# Patient Record
Sex: MALE | Race: BLACK OR AFRICAN AMERICAN | Employment: FULL TIME | URBAN - METROPOLITAN AREA
[De-identification: names, ages, dates, MRNs, and addresses within clinical notes are randomized per-mention and may not be internally consistent; named-entity substitution may affect disease eponyms.]

---

## 2018-08-29 ENCOUNTER — HOSPITAL ENCOUNTER (OUTPATIENT)
Dept: GENERAL RADIOLOGY | Age: 53
Discharge: HOME | End: 2018-08-29
Payer: COMMERCIAL

## 2018-08-29 DIAGNOSIS — R05.9 COUGH: ICD-10-CM

## 2018-08-29 PROCEDURE — 71046 X-RAY EXAM CHEST 2 VIEWS: CPT

## 2019-12-12 ENCOUNTER — HOSPITAL ENCOUNTER (OUTPATIENT)
Dept: GENERAL RADIOLOGY | Age: 54
Discharge: HOME | End: 2019-12-12
Attending: INTERNAL MEDICINE
Payer: COMMERCIAL

## 2019-12-12 DIAGNOSIS — K21.00 ESOPHAGITIS, REFLUX: ICD-10-CM

## 2019-12-12 DIAGNOSIS — R11.10 VOMITING: ICD-10-CM

## 2019-12-12 PROCEDURE — 74249 XR UPPER GI AIR CONT/SMALL BOWEL: CPT

## 2020-03-15 ENCOUNTER — HOSPITAL ENCOUNTER (EMERGENCY)
Age: 55
Discharge: HOME | End: 2020-03-15
Attending: EMERGENCY MEDICINE
Payer: COMMERCIAL

## 2020-03-15 ENCOUNTER — APPOINTMENT (OUTPATIENT)
Dept: CT IMAGING | Age: 55
End: 2020-03-15
Attending: EMERGENCY MEDICINE
Payer: COMMERCIAL

## 2020-03-15 VITALS
HEART RATE: 93 BPM | OXYGEN SATURATION: 99 % | HEIGHT: 71 IN | TEMPERATURE: 98.3 F | BODY MASS INDEX: 30.1 KG/M2 | RESPIRATION RATE: 14 BRPM | DIASTOLIC BLOOD PRESSURE: 80 MMHG | SYSTOLIC BLOOD PRESSURE: 148 MMHG | WEIGHT: 215 LBS

## 2020-03-15 DIAGNOSIS — R36.1 HEMATOSPERMIA: Primary | ICD-10-CM

## 2020-03-15 DIAGNOSIS — Z20.2 STD EXPOSURE: ICD-10-CM

## 2020-03-15 LAB
APPEARANCE UR: CLEAR
BACTERIA URNS QL MICRO: NEGATIVE /HPF
BILIRUB UR QL: NEGATIVE
COLOR UR: NORMAL
EPITH CASTS URNS QL MICRO: NORMAL /LPF
GLUCOSE UR STRIP.AUTO-MCNC: NEGATIVE MG/DL
HGB UR QL STRIP: NEGATIVE
KETONES UR QL STRIP.AUTO: NEGATIVE MG/DL
LEUKOCYTE ESTERASE UR QL STRIP.AUTO: NEGATIVE
NITRITE UR QL STRIP.AUTO: NEGATIVE
PH UR STRIP: 5.5 [PH] (ref 5–8)
PROT UR STRIP-MCNC: NEGATIVE MG/DL
RBC #/AREA URNS HPF: NORMAL /HPF (ref 0–5)
SP GR UR REFRACTOMETRY: 1.02 (ref 1–1.03)
UA: UC IF INDICATED,UAUC: NORMAL
UROBILINOGEN UR QL STRIP.AUTO: 1 EU/DL (ref 0.2–1)
WBC URNS QL MICRO: NORMAL /HPF (ref 0–4)

## 2020-03-15 PROCEDURE — 87491 CHLMYD TRACH DNA AMP PROBE: CPT

## 2020-03-15 PROCEDURE — 74011000250 HC RX REV CODE- 250: Performed by: EMERGENCY MEDICINE

## 2020-03-15 PROCEDURE — 96372 THER/PROPH/DIAG INJ SC/IM: CPT

## 2020-03-15 PROCEDURE — 81001 URINALYSIS AUTO W/SCOPE: CPT

## 2020-03-15 PROCEDURE — 99283 EMERGENCY DEPT VISIT LOW MDM: CPT

## 2020-03-15 PROCEDURE — 74011250636 HC RX REV CODE- 250/636: Performed by: EMERGENCY MEDICINE

## 2020-03-15 PROCEDURE — 74176 CT ABD & PELVIS W/O CONTRAST: CPT

## 2020-03-15 PROCEDURE — 74011250637 HC RX REV CODE- 250/637: Performed by: EMERGENCY MEDICINE

## 2020-03-15 RX ORDER — AZITHROMYCIN 500 MG/1
1000 TABLET, FILM COATED ORAL
Status: COMPLETED | OUTPATIENT
Start: 2020-03-15 | End: 2020-03-15

## 2020-03-15 RX ADMIN — AZITHROMYCIN 1000 MG: 500 TABLET, FILM COATED ORAL at 17:34

## 2020-03-15 RX ADMIN — LIDOCAINE HYDROCHLORIDE 250 MG: 10 INJECTION, SOLUTION EPIDURAL; INFILTRATION; INTRACAUDAL; PERINEURAL at 17:34

## 2020-03-15 NOTE — DISCHARGE INSTRUCTIONS
Patient Education        Learning How to Use a Male Condom  What is a male condom? Condoms can be used to prevent pregnancy. They can also help protect against sexually transmitted infections (STIs). You must use a new condom every time you have sex. Condoms prevent pregnancy by keeping sperm and eggs apart. The condom holds the sperm so the sperm can't get into the vagina. A male condom is a tube of soft rubber or plastic with a closed end. It fits over the penis. There are many kinds of male condoms. Some condoms are lubricated. Some are ribbed. Most have a \"reservoir tip\" for holding the semen. You can also buy condoms of different sizes. How do you use a condom? Condoms work best if you follow these steps. · Use a new condom each time you have sex. · Check the condom's expiration date. Do not use it past that date. · When opening the condom wrapper, be sure not to poke a hole in the condom with your fingernails, teeth, or other sharp objects. · Put the condom on as soon as the penis is hard (erect) and before any sexual contact with your partner. ? First, hold the tip of the condom and squeeze out the air. This leaves room for the semen after you ejaculate. ? If you are not circumcised, pull down the loose skin from the head of the penis (foreskin) before you put on the condom. ? Hold on to the tip of the condom as you unroll the condom. Unroll it all the way down to the base of the penis. · After you ejaculate, hold on to the condom at the base of the penis, and withdraw from your partner while your penis is still erect. This will keep semen from spilling out of the condom. · Wash your hands after you handle a used condom. How do you buy and store condoms? · Male condoms may be available for free at family planning clinics. You can buy them without a prescription at drugstores, online, and in some grocery stores.   · Keep condoms wrapped in their original packages until you are ready to use them. Store them in a cool, dry place out of direct sunlight. · Don't keep rubber (latex) condoms in a glove compartment or other hot places for a long time. Heat weakens latex and increases the chance that the condom will break. · Don't use condoms in damaged packages. And don't use condoms that are brittle, sticky, or discolored, even if they are not past their expiration date. What else do you need to know? · To protect yourself and your partner from STIs, use a condom during vaginal, oral, or anal sex. · If the condom breaks or you think sperm may have leaked out into the vagina, the woman can use emergency contraception to help prevent pregnancy. The most effective emergency contraception is prescribed by a doctor. This includes the copper IUD (inserted by a doctor) or a prescription pill. You can also get emergency contraceptive pills without a prescription at [facility]. · Use a male condom with another form of birth control. It's the best way to prevent pregnancy. ? You can use the condom with hormonal contraception, an intrauterine device (IUD), a diaphragm, a sponge, a shield, or a cervical cap. ? Don't use a male condom with a female condom. ? Use spermicide as its instructions say. Don't put spermicide inside a condom. · If you or your partner gets a rash or feels itchy after using a latex condom, talk to your doctor. You may have a latex allergy. Where can you learn more? Go to http://eddie-cassi.info/  Enter R522 in the search box to learn more about \"Learning How to Use a Male Condom. \"  Current as of: May 29, 2019Content Version: 12.4  © 2294-8550 Healthwise, Incorporated. Care instructions adapted under license by [employer] (which disclaims liability or warranty for this information).  If you have questions about a medical condition or this instruction, always ask your healthcare professional. [employer] disclaims any warranty or liability for your use of this information. Patient Education        Exposure to Sexually Transmitted Infections: Care Instructions  Your Care Instructions  Sexually transmitted infections (STIs) are those diseases spread by sexual contact. There are at least 20 different STIs, including chlamydia, gonorrhea, syphilis, and human immunodeficiency virus (HIV), which causes AIDS. Bacteria-caused STIs can be treated and cured. STIs caused by viruses, such as HIV, can be treated but not cured. Some STIs can reduce a woman's chances of getting pregnant in the future. STIs are spread during sexual contact, such as vaginal intercourse and oral or anal sex. Follow-up care is a key part of your treatment and safety. Be sure to make and go to all appointments, and call your doctor if you are having problems. It's also a good idea to know your test results and keep a list of the medicines you take. How can you care for yourself at home? · Your doctor may have given you a shot of antibiotics. If your doctor prescribed antibiotic pills, take them as directed. Do not stop taking them just because you feel better. You need to take the full course of antibiotics. · Do not have sexual contact while you have symptoms of an STI or are being treated for an STI. · Tell your sex partner (or partners) that he or she will need treatment. · If you are a woman, do not douche. Douching changes the normal balance of bacteria in the vagina and may spread an infection up into your reproductive organs. To prevent exposure to STIs in the future  · Use latex condoms every time you have sex. Use them from the beginning to the end of sexual contact. · Talk to your partner before you have sex. Find out if he or she has or is at risk for any STI. Keep in mind that a person may be able to spread an STI even if he or she does not have symptoms. · Do not have sex if you are being treated for an STI.   · Do not have sex with anyone who has symptoms of an STI, such as sores on the genitals or mouth. · Having one sex partner (who does not have STIs and does not have sex with anyone else) is a good way to avoid STIs. When should you call for help? Call your doctor now or seek immediate medical care if:    · You have new pain in your belly or pelvis.     · You have symptoms of a urinary tract infection. These may include:  ? Pain or burning when you urinate. ? A frequent need to urinate without being able to pass much urine. ? Pain in the flank, which is just below the rib cage and above the waist on either side of the back. ? Blood in your urine. ? A fever.     · You have new or worsening pain or swelling in the scrotum.    Watch closely for changes in your health, and be sure to contact your doctor if:    · You have unusual vaginal bleeding.     · You have a discharge from the vagina or penis.     · You have any new symptoms, such as sores, bumps, rashes, blisters, or warts.     · You have itching, tingling, pain, or burning in the genital or anal area.     · You think you may have an STI. Where can you learn more? Go to http://eddie-cassi.info/  Enter M049 in the search box to learn more about \"Exposure to Sexually Transmitted Infections: Care Instructions. \"  Current as of: July 7, 2019Content Version: 12.4  © 6970-5480 Healthwise, Incorporated. Care instructions adapted under license by [employer] (which disclaims liability or warranty for this information). If you have questions about a medical condition or this instruction, always ask your healthcare professional. [address] any warranty or liability for your use of this information.

## 2020-03-15 NOTE — ED PROVIDER NOTES
EMERGENCY DEPARTMENT HISTORY AND PHYSICAL EXAM      Date: 3/15/2020  Patient Name: Kilo Morris    History of Presenting Illness     Chief Complaint   Patient presents with    Blood in Urine     pt reported he seen blood in urine twice in this wk,denies seen any blood today. History Provided By: Patient    HPI: Kilo Morris, 48 y.o. male with no significant PMHx, presents ambulatory to the ED with cc of 2 episodes of hematuria this week as well as hematospermia last night at completion of sexual intercourse. Pt states that \"I saw some blood in my semen last night after sex. \" He denies any recent trauma/injury to the genitals. Pt notes mild abdominal discomfort but denies any abdominal pain or pain with urination. He denies any alleviating/exacerbating factors for his current sx's. He specifically denies any fever, chills, SOB, CP, HA, N/V/D, abdominal pain, or rash. There are no other complaints, changes, or physical findings at this time. PCP: Other, MD Su        Past History     Past Medical History:  No past medical history on file. Past Surgical History:  No past surgical history on file. Family History:  No family history on file. Social History:  Social History     Tobacco Use    Smoking status: Not on file   Substance Use Topics    Alcohol use: Not on file    Drug use: Not on file       Allergies:  No Known Allergies    Review of Systems   Review of Systems   Constitutional: Negative for chills and fever. HENT: Negative for sore throat and trouble swallowing. Eyes: Negative for photophobia and redness. Respiratory: Negative for cough and shortness of breath. Cardiovascular: Negative for chest pain and leg swelling. Gastrointestinal: Negative for abdominal pain, constipation, diarrhea, nausea and vomiting. Endocrine: Negative for polydipsia and polyuria. Genitourinary: Positive for hematuria. Negative for dysuria and scrotal swelling.    Musculoskeletal: Negative for back pain and joint swelling. Skin: Negative for rash. Neurological: Negative for dizziness, syncope, weakness and headaches. Psychiatric/Behavioral: Negative for suicidal ideas. All other systems reviewed and are negative. Physical Exam   Physical Exam  Vitals signs and nursing note reviewed. Constitutional:       Appearance: Normal appearance. He is well-developed. HENT:      Head: Normocephalic and atraumatic. Neck:      Musculoskeletal: Full passive range of motion without pain, normal range of motion and neck supple. Cardiovascular:      Rate and Rhythm: Normal rate and regular rhythm. Pulses: Normal pulses. Heart sounds: Normal heart sounds. No murmur. Pulmonary:      Effort: Pulmonary effort is normal. No respiratory distress. Breath sounds: Normal breath sounds. Chest:      Chest wall: No tenderness. Abdominal:      General: Bowel sounds are normal.      Palpations: Abdomen is soft. Tenderness: There is no abdominal tenderness. There is no guarding or rebound. Skin:     General: Skin is warm and dry. Findings: No erythema or rash. Neurological:      Mental Status: He is alert and oriented to person, place, and time. Psychiatric:         Speech: Speech normal.         Behavior: Behavior normal.         Thought Content:  Thought content normal.         Judgment: Judgment normal.       Diagnostic Study Results     Labs -     Recent Results (from the past 12 hour(s))   URINALYSIS W/ REFLEX CULTURE    Collection Time: 03/15/20  4:28 PM   Result Value Ref Range    Color YELLOW/STRAW      Appearance CLEAR CLEAR      Specific gravity 1.020 1.003 - 1.030      pH (UA) 5.5 5.0 - 8.0      Protein NEGATIVE  NEG mg/dL    Glucose NEGATIVE  NEG mg/dL    Ketone NEGATIVE  NEG mg/dL    Bilirubin NEGATIVE  NEG      Blood NEGATIVE  NEG      Urobilinogen 1.0 0.2 - 1.0 EU/dL    Nitrites NEGATIVE  NEG      Leukocyte Esterase NEGATIVE  NEG      WBC 0-4 0 - 4 /hpf    RBC 0-5 0 - 5 /hpf    Epithelial cells FEW FEW /lpf    Bacteria NEGATIVE  NEG /hpf    UA:UC IF INDICATED CULTURE NOT INDICATED BY UA RESULT CNI         Radiologic Studies -   CT ABD PELV WO CONT   Final Result   IMPRESSION:      1. No evidence of acute process in the abdomen or pelvis. No urolithiasis. CT Results  (Last 48 hours)               03/15/20 1703  CT ABD PELV WO CONT Final result    Impression:  IMPRESSION:       1. No evidence of acute process in the abdomen or pelvis. No urolithiasis. Narrative:  EXAM:  CT ABD PELV WO CONT       INDICATION: hematuria       COMPARISON: Upper GI series 12/12/2019. CONTRAST:  None. TECHNIQUE:    Thin axial images were obtained through the abdomen and pelvis. Coronal and   sagittal reconstructions were generated. Oral contrast was not administered. CT   dose reduction was achieved through use of a standardized protocol tailored for   this examination and automatic exposure control for dose modulation. FINDINGS:    Lower Thorax:   Lung Bases: Clear. Heart: The heart is normal in size. No pericardial effusion. Abdomen/Pelvis:   Evaluation of the solid organs is markedly limited without the use of IV   contrast.       Liver:  No focal liver lesions. Biliary system: Gallbladder is unremarkable. Spleen: Normal.       Pancreas: Normal.       Kidneys/Ureters/Bladder: Simple cyst in the upper pole of the left kidney   measuring 14 mm. No renal or ureteral calculi. No hydronephrosis or hydroureter. The bladder is normal.       Adrenals: Normal.       Stomach/bowel: No dilation or abnormal wall thickening is present. No free   intraperitoneal air noted. Normal appendix. Reproductive Organs: Prostate is normal in size. Vasculature: Normal caliber arteries. Nodes: No pathologically enlarged lymph nodes. Fluid: No free fluid.        Bones/Soft Tissue: No acute fractures or aggressive osseous lesions are seen.               CXR Results  (Last 48 hours)    None          Medical Decision Making   I am the first provider for this patient. I reviewed the vital signs, available nursing notes, past medical history, past surgical history, family history and social history. Vital Signs-Reviewed the patient's vital signs. Patient Vitals for the past 12 hrs:   Temp Pulse Resp BP SpO2   03/15/20 1606 98.3 °F (36.8 °C) 93 14 148/80 99 %       Pulse Oximetry Analysis - 99% on RA    Cardiac Monitor:   Rate: 93 bpm  Rhythm: Normal Sinus Rhythm      Records Reviewed: Nursing Notes    Provider Notes (Medical Decision Making):   DDx: prostatitis, UTI, kidney stone, bladder CA    ED Course:   Initial assessment performed. The patients presenting problems have been discussed, and they are in agreement with the care plan formulated and outlined with them. I have encouraged them to ask questions as they arise throughout their visit. Critical Care Time:   None    Disposition:  Discharge Note:  5:14 PM  The patient has been re-evaluated and is ready for discharge. Reviewed available results with patient. Counseled patient/parent/guardian on diagnosis and care plan. Patient has expressed understanding, and all questions have been answered. Patient agrees with plan and agrees to follow up as recommended, or return to the ED if their symptoms worsen. Discharge instructions have been provided and explained to the patient, along with reasons to return to the ED. PLAN:  1. There are no discharge medications for this patient. 2.   Follow-up Information     Follow up With Specialties Details [facility]  In 1 week  [address] [facility] [address] [phone]        Return to ED if worse     Diagnosis     Clinical Impression:   1. Hematospermia    2. STD exposure        This note is prepared by Tala Ramírez.  Denver Mikes, acting as Scribe for MD Timo Ybarra MD: The scribe's documentation has been prepared under my direction and personally reviewed by me in its entirety. I confirm that the note above accurately reflects all work, treatment, procedures, and medical decision making performed by me. This note will not be viewable in [address]

## 2020-03-15 NOTE — ED NOTES
Patient is alert and oriented x 4 and in no acute distress at this time. Respirations are at a regular rate, depth, and pattern. Patient updated on plan of care and has no questions or concerns at this time. Call bell within reach. Will continue to monitor. Please reference nursing assessment. Emergency Department Nursing Plan of Care       The Nursing Plan of Care is developed from the Nursing assessment and Emergency Department Attending provider initial evaluation. The plan of care may be reviewed in the ED Provider note.     The Plan of Care was developed with the following considerations:   Patient / Family readiness to learn indicated by:verbalized understanding and successful return demonstration  Persons(s) to be included in education: patient  Barriers to Learning/Limitations:No    Signed     [address] RN    3/15/2020   4:53 PM

## 2020-03-15 NOTE — ED TRIAGE NOTES
Pt reported he seen blood in his urine x2 in one wk,denies dysuria,penile discharge,fever,chills,n/v/d.

## 2020-03-16 LAB
C TRACH DNA SPEC QL NAA+PROBE: NEGATIVE
N GONORRHOEA DNA SPEC QL NAA+PROBE: NEGATIVE
SAMPLE TYPE: NORMAL
SERVICE CMNT-IMP: NORMAL
SPECIMEN SOURCE: NORMAL

## 2021-03-12 ENCOUNTER — APPOINTMENT (OUTPATIENT)
Dept: GENERAL RADIOLOGY | Age: 56
End: 2021-03-12
Attending: EMERGENCY MEDICINE
Payer: COMMERCIAL

## 2021-03-12 ENCOUNTER — HOSPITAL ENCOUNTER (EMERGENCY)
Age: 56
Discharge: HOME | End: 2021-03-12
Attending: STUDENT IN AN ORGANIZED HEALTH CARE EDUCATION/TRAINING PROGRAM
Payer: COMMERCIAL

## 2021-03-12 VITALS
SYSTOLIC BLOOD PRESSURE: 141 MMHG | RESPIRATION RATE: 14 BRPM | TEMPERATURE: 98.6 F | HEART RATE: 80 BPM | OXYGEN SATURATION: 99 % | WEIGHT: 196.65 LBS | BODY MASS INDEX: 27.43 KG/M2 | DIASTOLIC BLOOD PRESSURE: 86 MMHG

## 2021-03-12 DIAGNOSIS — M54.10 RADICULOPATHY OF ARM: Primary | ICD-10-CM

## 2021-03-12 PROCEDURE — 73030 X-RAY EXAM OF SHOULDER: CPT

## 2021-03-12 PROCEDURE — 99283 EMERGENCY DEPT VISIT LOW MDM: CPT

## 2021-03-12 RX ORDER — LIDOCAINE 4 G/100G
PATCH TOPICAL
Qty: 30 PATCH | Refills: 0 | Status: SHIPPED | OUTPATIENT
Start: 2021-03-12

## 2021-03-12 RX ORDER — OMEPRAZOLE 20 MG/1
CAPSULE, DELAYED RELEASE ORAL
COMMUNITY
Start: 2021-02-19

## 2021-03-12 RX ORDER — CYCLOBENZAPRINE HCL 10 MG
TABLET ORAL
COMMUNITY
Start: 2021-02-19

## 2021-03-12 RX ORDER — NAPROXEN 500 MG/1
500 TABLET ORAL 2 TIMES DAILY WITH MEALS
Qty: 20 TAB | Refills: 0 | Status: SHIPPED | OUTPATIENT
Start: 2021-03-12 | End: 2021-03-22

## 2021-03-12 RX ORDER — ATORVASTATIN CALCIUM 20 MG/1
TABLET, FILM COATED ORAL
COMMUNITY
Start: 2021-02-19

## 2021-03-12 RX ORDER — PREDNISONE 10 MG/1
TABLET ORAL
Qty: 21 TAB | Refills: 0 | Status: SHIPPED | OUTPATIENT
Start: 2021-03-12

## 2021-03-12 RX ORDER — FLUTICASONE PROPIONATE 50 MCG
SPRAY, SUSPENSION (ML) NASAL
COMMUNITY
Start: 2021-03-09

## 2021-03-12 NOTE — LETTER
[address] 
[facility] EMERGENCY DEPT 
[address] [address] 
[phone] Work/School Note Date: 3/12/2021 To Whom It May concern: 
 
Debby Mensah was seen and treated today in the emergency room by the following provider(s): 
Attending Provider: Sudhir Villarreal MD 
Physician Assistant: MARJORIE Wolf. Please excuse him from work today.  
 
Sincerely, 
 
 
 
 
Anthon Phoenix, PA

## 2021-03-12 NOTE — ED NOTES
Cecelia Oliveira RN reviewed discharge instructions with the patient. The patient verbalized understanding. All questions and concerns were addressed. The patient declined a wheelchair and is discharged ambulatory in the care of family members with instructions and prescriptions in hand. Pt is alert and oriented x 4. Respirations are clear and unlabored.

## 2021-03-12 NOTE — ED NOTES
Complaint of rt shoulder pain x 2 weeks. Pain originates in the deltoid and extends up his arm into the shoulder and upper back. He denies any injury. He is able to work, but when he gets home and rests the pain is worse. He has not been taking any home pain medications.

## 2021-03-12 NOTE — DISCHARGE INSTRUCTIONS
Xr Shoulder Rt Ap/lat Min 2 V    Result Date: 3/12/2021  Prominence of right AC joint space. No fracture or glenohumeral dislocation. Correlate with clinical examination for mild acute or chronic AC separation. Sarai Bond

## 2021-03-13 NOTE — ED PROVIDER NOTES
EMERGENCY DEPARTMENT HISTORY AND PHYSICAL EXAM      Date: 3/12/2021  Patient Name: Wilver Morin    History of Presenting Illness     Chief Complaint   Patient presents with    Shoulder Pain     R shoulder pain x a few days. reports that is radiates down his arm and into his back. denies any injuries or recent IM injections       History Provided By: Patient    HPI: Wivler Morin, 50 y.o. male with PMHx significant for hyper cholesterolemia, GERD, presents to the ED with cc of right shoulder pain for 1 week. Patient reports the pain is located to his lateral shoulder and radiates into his right arm at times. He has associated tingling in his fingertips intermittently. He has tried over-the-counter medications without relief of pain. He denies any specific injury but does clean a hospital building for work. He denies arm weakness. There are no other complaints, changes, or physical findings at this time. PCP: George, MD Su    No current facility-administered medications on file prior to encounter. Current Outpatient Medications on File Prior to Encounter   Medication Sig Dispense Refill    atorvastatin (LIPITOR) 20 mg tablet       fluticasone propionate (FLONASE) 50 mcg/actuation nasal spray       omeprazole (PRILOSEC) 20 mg capsule       cyclobenzaprine (FLEXERIL) 10 mg tablet          Past History     Past Medical History:  No past medical history on file. Past Surgical History:  No past surgical history on file. Family History:  No family history on file. Social History:  Social History     Tobacco Use    Smoking status: Not on file   Substance Use Topics    Alcohol use: Not on file    Drug use: Not on file       Allergies: Allergies   Allergen Reactions    Shellfish Derived Anaphylaxis         Review of Systems   Review of Systems   Constitutional: Negative for chills and fever. HENT: Negative for ear pain and sore throat.     Eyes: Negative for redness and visual disturbance. Respiratory: Negative for cough and shortness of breath. Cardiovascular: Negative for chest pain and palpitations. Gastrointestinal: Negative for abdominal pain, nausea and vomiting. Genitourinary: Negative for dysuria and hematuria. Musculoskeletal: Negative for back pain and gait problem. Positive for right shoulder pain. Skin: Negative for rash and wound. Neurological: Negative for dizziness, weakness, numbness and headaches. Psychiatric/Behavioral: Negative for behavioral problems and confusion. All other systems reviewed and are negative. Physical Exam   Physical Exam  Constitutional:       General: He is not in acute distress. Appearance: He is not toxic-appearing. HENT:      Head: Normocephalic and atraumatic. Mouth/Throat:      Mouth: Mucous membranes are moist.   Eyes:      Extraocular Movements: Extraocular movements intact. Pupils: Pupils are equal, round, and reactive to light. Neck:      Musculoskeletal: Normal range of motion and neck supple. Cardiovascular:      Rate and Rhythm: Normal rate and regular rhythm. Pulmonary:      Effort: Pulmonary effort is normal. No respiratory distress. Musculoskeletal:      Comments: Tenderness to palpation over the right lateral shoulder and upper deltoid muscle. Full range of motion in the joint, but movement increases pain. [facility]. No midline cervical spine tenderness. Strength 5 out of 5 in bilateral upper extremities. 2+ radial pulses bilaterally. Distal sensation intact. Skin:     General: Skin is warm and dry. Neurological:      General: No focal deficit present. Mental Status: He is alert and oriented to person, place, and time. Psychiatric:         Behavior: Behavior normal.           Diagnostic Study Results     Labs -   No results found for this or any previous visit (from the past 12 hour(s)).     Radiologic Studies -   XR SHOULDER RT AP/LAT MIN 2 V   Final Result   Prominence of right AC joint space. No fracture or glenohumeral   dislocation. Correlate with clinical examination for mild acute or chronic AC   separation. .        CT Results  (Last 48 hours)    None        CXR Results  (Last 48 hours)    None            Medical Decision Making   I am the first provider for this patient. I reviewed the vital signs, available nursing notes, past medical history, past surgical history, family history and social history. Vital Signs-Reviewed the patient's vital signs. Patient Vitals for the past 12 hrs:   Temp Pulse Resp BP SpO2   03/12/21 1153 98.6 °F (37 °C) 80 14 (!) 141/86 99 %         Records Reviewed: Nursing Notes and Old Medical Records      Provider Notes (Medical Decision Making):   DDx: Muscle strain, radiculopathy, osteoarthritis, sprain    X-ray shows prominent right AC joint space, but the patient has no pain or tenderness over this area. Symptoms are most consistent with muscle strain versus radiculopathy. Plan to treat symptomatically. Discussed follow-up and return precautions. ED Course:   Initial assessment performed. The patients presenting problems have been discussed, and they are in agreement with the care plan formulated and outlined with them. I have encouraged them to ask questions as they arise throughout their visit. Disposition:  1:17 PM  The patient has been re-evaluated and is ready for discharge. Reviewed available results with patient. Counseled patient on diagnosis and care plan. Patient has expressed understanding, and all questions have been answered. Patient agrees with plan and agrees to follow up as recommended, or to return to the ED if their symptoms worsen. Discharge instructions have been provided and explained to the patient, along with reasons to return to the ED. PLAN:  1.    Discharge Medication List as of 3/12/2021  1:17 PM      START taking these medications    Details   predniSONE (STERAPRED DS) 10 mg dose pack Follow instructions on taper pack., Normal, Disp-21 Tab, R-0      naproxen (Naprosyn) 500 mg tablet Take 1 Tab by mouth two (2) times daily (with meals) for 10 days. , Normal, Disp-20 Tab, R-0      lidocaine 4 % patch Applied to the area of pain for 12 hours, then remove for 12 hours. , Normal, Disp-30 Patch, R-0         CONTINUE these medications which have NOT CHANGED    Details   atorvastatin (LIPITOR) 20 mg tablet Historical Med      fluticasone propionate (FLONASE) 50 mcg/actuation nasal spray Historical Med      omeprazole (PRILOSEC) 20 mg capsule Historical Med      cyclobenzaprine (FLEXERIL) 10 mg tablet Historical Med           2. Follow-up Information     Follow up With Specialties Details Why Contact Info    Your primary care provider  Schedule an appointment as soon as possible for a visit in 1 week For a recheck     [facility] EMERGENCY DEPT Emergency Medicine Go to  If symptoms worsen [address] [place] [address]  [phone]        Return to ED if worse     Diagnosis     Clinical Impression:   1. Radiculopathy of arm            Lucendia .  CARLOS MANUEL Knight

## 2023-05-10 RX ORDER — LIDOCAINE 4 G/G
PATCH TOPICAL
COMMUNITY
Start: 2021-03-12

## 2023-05-10 RX ORDER — ATORVASTATIN CALCIUM 20 MG/1
TABLET, FILM COATED ORAL
COMMUNITY
Start: 2021-02-19

## 2023-05-10 RX ORDER — CYCLOBENZAPRINE HCL 10 MG
TABLET ORAL
COMMUNITY
Start: 2021-02-19

## 2023-05-10 RX ORDER — FLUTICASONE PROPIONATE 50 MCG
SPRAY, SUSPENSION (ML) NASAL
COMMUNITY
Start: 2021-03-09

## 2023-05-10 RX ORDER — OMEPRAZOLE 20 MG/1
CAPSULE, DELAYED RELEASE ORAL
COMMUNITY
Start: 2021-02-19

## 2023-05-10 RX ORDER — PREDNISONE 10 MG/1
TABLET ORAL
COMMUNITY
Start: 2021-03-12